# Patient Record
Sex: FEMALE | Race: WHITE | NOT HISPANIC OR LATINO
[De-identification: names, ages, dates, MRNs, and addresses within clinical notes are randomized per-mention and may not be internally consistent; named-entity substitution may affect disease eponyms.]

---

## 2021-03-20 PROBLEM — Z00.00 ENCOUNTER FOR PREVENTIVE HEALTH EXAMINATION: Status: ACTIVE | Noted: 2021-03-20

## 2021-06-08 ENCOUNTER — APPOINTMENT (OUTPATIENT)
Dept: ENDOCRINOLOGY | Facility: CLINIC | Age: 51
End: 2021-06-08
Payer: COMMERCIAL

## 2021-06-08 VITALS
HEIGHT: 65 IN | DIASTOLIC BLOOD PRESSURE: 70 MMHG | OXYGEN SATURATION: 97 % | SYSTOLIC BLOOD PRESSURE: 110 MMHG | HEART RATE: 70 BPM | BODY MASS INDEX: 27.66 KG/M2 | WEIGHT: 166 LBS

## 2021-06-08 DIAGNOSIS — Z78.9 OTHER SPECIFIED HEALTH STATUS: ICD-10-CM

## 2021-06-08 DIAGNOSIS — Z86.39 PERSONAL HISTORY OF OTHER ENDOCRINE, NUTRITIONAL AND METABOLIC DISEASE: ICD-10-CM

## 2021-06-08 DIAGNOSIS — Z80.9 FAMILY HISTORY OF MALIGNANT NEOPLASM, UNSPECIFIED: ICD-10-CM

## 2021-06-08 DIAGNOSIS — Z82.49 FAMILY HISTORY OF ISCHEMIC HEART DISEASE AND OTHER DISEASES OF THE CIRCULATORY SYSTEM: ICD-10-CM

## 2021-06-08 PROCEDURE — 99072 ADDL SUPL MATRL&STAF TM PHE: CPT

## 2021-06-08 PROCEDURE — 99204 OFFICE O/P NEW MOD 45 MIN: CPT

## 2021-06-08 NOTE — HISTORY OF PRESENT ILLNESS
[FreeTextEntry1] : Marcy is a 51 yr old female, who I was asked to see in consultation for evaluation  hypothyroidism,. thyroid nodule.\par Per patient she was diagnosed more than 20 years ago.She says she was very fatigued then.\par Still complains of fatigue.\par Has been on replacement since then.\par Currently on levothyroxine 125 mcg for 6 days and 250 mcg for 1 day a week.\par  takes it empty stomach in morning, denies missing any doses. \par \par Has had ultrasound for thyroid nodule follow up for about 10 years, remembers getting FNA of the nodule, was benign.\par Has had recent follow up ultrasound 2/2021\par No family h/o thyroid disorder or cancer.no h/o neck radiation. No dysphagia, no SOB.Occasional sore throat when talking a lot.\par  Still feels tired.\par Occasional heat  intolerance, menopause since 1 yr, no weight changes, no constipation or diarrhea, no palpitations, no skin or hair changes.\par Takes weekly vitamin D and daily MVI .\par \par

## 2021-06-08 NOTE — PHYSICAL EXAM
[Alert] : alert [Well Nourished] : well nourished [No Acute Distress] : no acute distress [Normal Sclera/Conjunctiva] : normal sclera/conjunctiva [No Proptosis] : no proptosis [No Lid Lag] : no lid lag [No Neck Mass] : no neck mass was observed [Supple] : the neck was supple [Thyroid Not Enlarged] : the thyroid was not enlarged [No Thyroid Nodules] : no palpable thyroid nodules [No Respiratory Distress] : no respiratory distress [No Accessory Muscle Use] : no accessory muscle use [Normal Rate and Effort] : normal respiratory rate and effort [Clear to Auscultation] : lungs were clear to auscultation bilaterally [Normal S1, S2] : normal S1 and S2 [No Murmurs] : no murmurs [Normal Rate] : heart rate was normal [Regular Rhythm] : with a regular rhythm [Normal Bowel Sounds] : normal bowel sounds [Not Tender] : non-tender [Soft] : abdomen soft [Normal Supraclavicular Nodes] : no supraclavicular lymphadenopathy [Normal Anterior Cervical Nodes] : no anterior cervical lymphadenopathy [Normal Posterior Cervical Nodes] : no posterior cervical lymphadenopathy [No Stigmata of Cushings Syndrome] : no stigmata of Cushings Syndrome [Normal Gait] : normal gait [No Clubbing, Cyanosis] : no clubbing  or cyanosis of the fingernails [No Joint Swelling] : no joint swelling seen [No Skin Lesions] : no skin lesions [Acanthosis Nigricans] : no acanthosis nigricans [No Motor Deficits] : the motor exam was normal [No Tremors] : no tremors [Oriented x3] : oriented to person, place, and time [Normal Affect] : the affect was normal [Normal Insight/Judgement] : insight and judgment were intact [Normal Mood] : the mood was normal

## 2021-06-08 NOTE — ASSESSMENT
[FreeTextEntry1] : Marcy is a 51 yr old female, who I was asked to see in consultation for evaluation  hypothyroidism,. thyroid nodule.\par Per patient she was diagnosed more than 20 years ago.She says she was very fatigued then.\par Still complains of fatigue.\par Has been on replacement since then.\par Currently on levothyroxine 125 mcg for 6 days and 250 mcg for 1 day a week.\par \par \par Hypothyroidism:\par Last TSH was 1.12 6/2021.\par To continue same dose for now.\par \par Discussed with patient how to take thyroid medication appropriately,empty stomach ,\par all Multi vitamins  4 to 6 hrs away form thyroid medication, he voiced understanding.\par Will repeat blood work in 6 months.\par \par Thyroid nodules:\par Last ultrasound done in 2/21, shows 2 nodules, 6 mm and 1 cm.\par per report unchanged which is reassuring.\par \par I have discussed with the patient the incidence of thyroid nodules (fairly high) and the incidence of thyroid cancer (fairly low). We also discussed what can be the worrisome features of malignant nodule and increased incidence of thyroid cancer in patients .\par We discussed with patient that per IVANA  guidelines  FNA is not recommended in sub centimeter nodules in low risk patient like herself risk of malignancy is very low.\par Since the nodules are small and stable, will follow up on ultrasound, repeat ultrasound in 1 yr.\par \par RTC in 6 months\par \par

## 2021-06-08 NOTE — REVIEW OF SYSTEMS
[Fatigue] : fatigue [Decreased Appetite] : appetite not decreased [Recent Weight Gain (___ Lbs)] : no recent weight gain [Recent Weight Loss (___ Lbs)] : no recent weight loss [Visual Field Defect] : no visual field defect [Dry Eyes] : no dryness [Eye Pain] : no pain [Dysphagia] : no dysphagia [Neck Pain] : no neck pain [Dysphonia] : no dysphonia [Chest Pain] : no chest pain [Palpitations] : no palpitations [Lower Ext Edema] : no lower extremity edema [Shortness Of Breath] : no shortness of breath [Cough] : no cough [Orthopnea] : no orthopnea [Nausea] : no nausea [Constipation] : no constipation [Abdominal Pain] : no abdominal pain [Vomiting] : no vomiting [Diarrhea] : no diarrhea [Polyuria] : no polyuria [Dysuria] : no dysuria [Joint Pain] : no joint pain [Muscle Weakness] : no muscle weakness [Myalgia] : no myalgia  [Acanthosis] : no acanthosis  [Acne] : no acne [Dry Skin] : no dry skin [Headaches] : no headaches [Dizziness] : no dizziness [Tremors] : no tremors [Depression] : no depression [Insomnia] : no insomnia [Anxiety] : no anxiety [Polydipsia] : no polydipsia [Cold Intolerance] : no cold intolerance [Heat Intolerance] : heat intolerance [Easy Bleeding] : no ~M tendency for easy bleeding [Easy Bruising] : no tendency for easy bruising

## 2021-11-29 ENCOUNTER — NON-APPOINTMENT (OUTPATIENT)
Age: 51
End: 2021-11-29

## 2021-12-07 ENCOUNTER — APPOINTMENT (OUTPATIENT)
Dept: ENDOCRINOLOGY | Facility: CLINIC | Age: 51
End: 2021-12-07
Payer: COMMERCIAL

## 2021-12-07 VITALS
SYSTOLIC BLOOD PRESSURE: 118 MMHG | DIASTOLIC BLOOD PRESSURE: 70 MMHG | OXYGEN SATURATION: 97 % | BODY MASS INDEX: 29.16 KG/M2 | WEIGHT: 175 LBS | HEART RATE: 65 BPM | HEIGHT: 65 IN

## 2021-12-07 PROCEDURE — 99214 OFFICE O/P EST MOD 30 MIN: CPT

## 2022-03-02 ENCOUNTER — APPOINTMENT (OUTPATIENT)
Dept: ULTRASOUND IMAGING | Facility: CLINIC | Age: 52
End: 2022-03-02
Payer: COMMERCIAL

## 2022-03-02 ENCOUNTER — OUTPATIENT (OUTPATIENT)
Dept: OUTPATIENT SERVICES | Facility: HOSPITAL | Age: 52
LOS: 1 days | End: 2022-03-02
Payer: COMMERCIAL

## 2022-03-02 DIAGNOSIS — E04.2 NONTOXIC MULTINODULAR GOITER: ICD-10-CM

## 2022-03-02 PROCEDURE — 76536 US EXAM OF HEAD AND NECK: CPT

## 2022-03-02 PROCEDURE — 76536 US EXAM OF HEAD AND NECK: CPT | Mod: 26

## 2022-03-30 ENCOUNTER — APPOINTMENT (OUTPATIENT)
Dept: ENDOCRINOLOGY | Facility: CLINIC | Age: 52
End: 2022-03-30

## 2022-04-19 ENCOUNTER — APPOINTMENT (OUTPATIENT)
Dept: ENDOCRINOLOGY | Facility: CLINIC | Age: 52
End: 2022-04-19
Payer: COMMERCIAL

## 2022-04-19 VITALS
SYSTOLIC BLOOD PRESSURE: 112 MMHG | OXYGEN SATURATION: 98 % | DIASTOLIC BLOOD PRESSURE: 72 MMHG | BODY MASS INDEX: 29.16 KG/M2 | HEART RATE: 75 BPM | HEIGHT: 65 IN | WEIGHT: 175 LBS

## 2022-04-19 PROCEDURE — 99214 OFFICE O/P EST MOD 30 MIN: CPT

## 2022-04-19 NOTE — HISTORY OF PRESENT ILLNESS
[FreeTextEntry1] : Marcy is a 51 yr old female, who is here for follow up of hypothyroidism,. thyroid nodule.\par Per patient she was diagnosed more than 20 years ago.She says she was very fatigued then.\par Still complains of fatigue.\par Has been on replacement since then.\par Currently on levothyroxine 125 mcg for 6 days and 187.5 mcg for 1 day a week.\par  takes it empty stomach in morning, denies missing any doses. \par No new complains this visit, feels fine.\par Has had ultrasound for thyroid nodule remembers getting FNA of the nodule, was benign.\par Has had recent follow up ultrasound 3/2022\par No family h/o thyroid disorder or cancer.no h/o neck radiation. No dysphagia, no SOB.Occasional sore throat when talking a lot.\par  Still feels tired.\par Occasional heat intolerance, menopause since 1 yr, no weight changes, no constipation or diarrhea, no palpitations, no skin or hair changes.\par Takes Vit D 2000 every day. \par \par \par TSH 0.302\par \par Current Regimen: 125 mcg 6 day a week, 1.5 tablets once a week \par \par Thyroid U/S 3/2022\par Left Lobe\par 0.9 x 0.6 x 0.6 cm hypoechoic nodule in upper pole\par 0.9 x 0.9 x 0.8 cm cystic nodule in lower pole \par

## 2022-04-19 NOTE — ASSESSMENT
[Levothyroxine] : The patient was instructed to take Levothyroxine on an empty stomach, separate from vitamins, and wait at least 30 minutes before eating [FreeTextEntry1] : Marcy is a 51 yr old female, who is here for follow up of hypothyroidism,. thyroid nodule.\par She has been exercising and feeling well\par Still complains of fatigue.\par Has been on replacement since then.\par Currently on levothyroxine 125 mcg for 6 days and 187 mcg for 1 day a week.\par \par \par Hypothyroidism:\par recent TSH mildly low 0.302\par Will recommend to back off on dose slightly , to take 1 pill of 125 mcg QD\par repeat tft's in 6 weeks and adjust medication if needed\par \par Discussed again with patient how to take thyroid medication appropriately,empty stomach ,\par all Multi vitamins 4 to 6 hrs away form thyroid medication, he voiced understanding.\par Will repeat blood work in 6 months.\par \par Thyroid nodules:\par Last ultrasound done in 3/92264 s hows 2 nodules, 9 mm and 9mm on left lobe\par per report unchanged which is reassuring.\par \par I have discussed with the patient the incidence of thyroid nodules (fairly high) and the incidence of thyroid cancer (fairly low). We also discussed what can be the worrisome features of malignant nodule and increased incidence of thyroid cancer in patients.\par We discussed with patient that per IVANA guidelines FNA is not recommended in sub centimeter nodules in low risk patient like herself risk of malignancy is very low.\par Since the nodules are small and stable, will follow up on ultrasound, repeat ultrasound next visit.\par \par RTC in 3 months with Dr. Bocanegra\par

## 2022-04-19 NOTE — PHYSICAL EXAM
[Alert] : alert [Normal Sclera/Conjunctiva] : normal sclera/conjunctiva [Normal Hearing] : hearing was normal [No Neck Mass] : no neck mass was observed [Thyroid Not Enlarged] : the thyroid was not enlarged [No Respiratory Distress] : no respiratory distress [Clear to Auscultation] : lungs were clear to auscultation bilaterally [Normal S1, S2] : normal S1 and S2 [Normal Rate] : heart rate was normal [No Stigmata of Cushings Syndrome] : no stigmata of Cushings Syndrome [Normal Gait] : normal gait

## 2022-04-19 NOTE — REVIEW OF SYSTEMS
[Fatigue] : fatigue [Recent Weight Loss (___ Lbs)] : no recent weight loss [Recent Weight Gain (___ Lbs)] : no recent weight gain [Visual Field Defect] : no visual field defect [Blurred Vision] : no blurred vision [Dysphagia] : no dysphagia [Neck Pain] : no neck pain [Chest Pain] : no chest pain [Palpitations] : no palpitations [Nausea] : no nausea [Shortness Of Breath] : no shortness of breath [Constipation] : no constipation [Vomiting] : no vomiting [Diarrhea] : no diarrhea [Polyuria] : no polyuria [Polydipsia] : no polydipsia

## 2022-07-20 ENCOUNTER — APPOINTMENT (OUTPATIENT)
Dept: ENDOCRINOLOGY | Facility: CLINIC | Age: 52
End: 2022-07-20

## 2022-07-20 VITALS
HEIGHT: 65 IN | BODY MASS INDEX: 27.49 KG/M2 | HEART RATE: 74 BPM | DIASTOLIC BLOOD PRESSURE: 70 MMHG | WEIGHT: 165 LBS | SYSTOLIC BLOOD PRESSURE: 110 MMHG

## 2022-07-20 PROCEDURE — 99214 OFFICE O/P EST MOD 30 MIN: CPT

## 2022-07-20 RX ORDER — ROSUVASTATIN CALCIUM 10 MG/1
10 TABLET, FILM COATED ORAL DAILY
Refills: 0 | Status: DISCONTINUED | COMMUNITY
End: 2022-07-20

## 2022-07-20 NOTE — ASSESSMENT
[FreeTextEntry1] : Marcy is a 51 yr old female, who is here for follow up of hypothyroidism,. thyroid nodule.\par Per patient she was diagnosed more than 20 years ago.She says she was very fatigued then.\par Still complains of fatigue.\par Has been on replacement since then.\par Currently on levothyroxine 125 mcg for 6 days and 250 mcg for 1 day a week.\par \par \par Hypothyroidism:\par recent TSH 0.692\par continue same dose for now\par Repeat blood work in 3 months\par Discussed again with patient how to take thyroid medication appropriately,empty stomach ,\par all Multi vitamins  4 to 6 hrs away form thyroid medication, he voiced understanding.\par Will repeat blood work in 6 months.\par \par Thyroid nodules:\par Last ultrasound done in 2/21, shows 2 nodules, 6 mm and 1 cm.\par Then had recent US in 3/22 showed 0.9 cm, 2 nodules.No comparison done to previous one as done in different location, but on my comparison Does not look like a sig. change, and both nodules less than 1 cm, risk of malignancy is low, \par will follow up on yearly US.\par \par I have discussed with the patient the incidence of thyroid nodules (fairly high) and the incidence of thyroid cancer (fairly low). We also discussed what can be the worrisome features of malignant nodule and increased incidence of thyroid cancer in patients .\par We discussed with patient that per IVANA  guidelines  FNA is not recommended in sub centimeter nodules in low risk patient like herself risk of malignancy is very low.\par \par RTC in  6 months\par \par

## 2022-07-20 NOTE — HISTORY OF PRESENT ILLNESS
[FreeTextEntry1] : Marcy is a 52 yr old female, who is here for follow up of  hypothyroidism,. thyroid nodule.\par Per patient she was diagnosed more than 20 years ago.She says she was very fatigued then.\par Still complains of fatigue.\jovan Has been on replacement since then.\jovan Was on levothyroxine 125 mcg for 6 days and 1 and 1/2 pill  for 1 day a week.Then in 4/22 dose backed off to 125 mcg daily.\jovan  takes it empty stomach in morning, denies missing any doses. \par No new complains this visit, feels fine.\jovan Has lot 10 pounds and happy about it.\jovan Has had ultrasound for thyroid nodule follow up for about 10 years, remembers getting FNA of the nodule, was benign.\jovan Has had  follow up ultrasound 2/2021.\par Then had recent US in 3/22 showed 0.9 cm, 2 nodules.\par No family h/o thyroid disorder or cancer.no h/o neck radiation. No dysphagia, no SOB.Occasional sore throat when talking a lot.\par  Still feels tired.\par Occasional heat  intolerance, menopause since 2 yrs, no weight changes, no constipation or diarrhea, no palpitations, no skin or hair changes.\par Takes weekly vitamin D and daily MVI .\par \par

## 2022-07-20 NOTE — PHYSICAL EXAM
[Alert] : alert [Well Nourished] : well nourished [No Acute Distress] : no acute distress [Normal Sclera/Conjunctiva] : normal sclera/conjunctiva [No Proptosis] : no proptosis [No Lid Lag] : no lid lag [No Neck Mass] : no neck mass was observed [Supple] : the neck was supple [Thyroid Not Enlarged] : the thyroid was not enlarged [No Thyroid Nodules] : no palpable thyroid nodules [No Respiratory Distress] : no respiratory distress [No Accessory Muscle Use] : no accessory muscle use [Normal Rate and Effort] : normal respiratory rate and effort [Clear to Auscultation] : lungs were clear to auscultation bilaterally [Normal S1, S2] : normal S1 and S2 [No Murmurs] : no murmurs [Normal Rate] : heart rate was normal [Regular Rhythm] : with a regular rhythm [Normal Bowel Sounds] : normal bowel sounds [Not Tender] : non-tender [Soft] : abdomen soft [No Tremors] : no tremors [Oriented x3] : oriented to person, place, and time [Normal Affect] : the affect was normal [Normal Insight/Judgement] : insight and judgment were intact [Normal Mood] : the mood was normal

## 2022-07-20 NOTE — REVIEW OF SYSTEMS
[Heat Intolerance] : heat intolerance [Fatigue] : no fatigue [Decreased Appetite] : appetite not decreased [Recent Weight Gain (___ Lbs)] : no recent weight gain [Recent Weight Loss (___ Lbs)] : no recent weight loss [Visual Field Defect] : no visual field defect [Dry Eyes] : no dryness [Eye Pain] : no pain [Dysphagia] : no dysphagia [Neck Pain] : no neck pain [Dysphonia] : no dysphonia [Chest Pain] : no chest pain [Palpitations] : no palpitations [Lower Ext Edema] : no lower extremity edema [Shortness Of Breath] : no shortness of breath [Cough] : no cough [Orthopnea] : no orthopnea [Nausea] : no nausea [Constipation] : no constipation [Abdominal Pain] : no abdominal pain [Vomiting] : no vomiting [Diarrhea] : no diarrhea [Polyuria] : no polyuria [Dysuria] : no dysuria [Joint Pain] : no joint pain [Muscle Weakness] : no muscle weakness [Myalgia] : no myalgia  [Acanthosis] : no acanthosis  [Acne] : no acne [Dry Skin] : no dry skin [Headaches] : no headaches [Dizziness] : no dizziness [Tremors] : no tremors [Depression] : no depression [Insomnia] : no insomnia [Anxiety] : no anxiety [Polydipsia] : no polydipsia [Cold Intolerance] : no cold intolerance [Easy Bleeding] : no ~M tendency for easy bleeding [Easy Bruising] : no tendency for easy bruising

## 2023-01-24 ENCOUNTER — APPOINTMENT (OUTPATIENT)
Dept: ENDOCRINOLOGY | Facility: CLINIC | Age: 53
End: 2023-01-24
Payer: COMMERCIAL

## 2023-01-24 VITALS
HEIGHT: 65 IN | BODY MASS INDEX: 26.16 KG/M2 | HEART RATE: 68 BPM | WEIGHT: 157 LBS | SYSTOLIC BLOOD PRESSURE: 116 MMHG | DIASTOLIC BLOOD PRESSURE: 68 MMHG

## 2023-01-24 PROCEDURE — 99214 OFFICE O/P EST MOD 30 MIN: CPT

## 2023-06-30 ENCOUNTER — APPOINTMENT (OUTPATIENT)
Dept: ULTRASOUND IMAGING | Facility: CLINIC | Age: 53
End: 2023-06-30
Payer: COMMERCIAL

## 2023-06-30 ENCOUNTER — OUTPATIENT (OUTPATIENT)
Dept: OUTPATIENT SERVICES | Facility: HOSPITAL | Age: 53
LOS: 1 days | End: 2023-06-30
Payer: COMMERCIAL

## 2023-06-30 DIAGNOSIS — E03.9 HYPOTHYROIDISM, UNSPECIFIED: ICD-10-CM

## 2023-06-30 PROCEDURE — 76536 US EXAM OF HEAD AND NECK: CPT

## 2023-06-30 PROCEDURE — 76536 US EXAM OF HEAD AND NECK: CPT | Mod: 26

## 2023-08-09 ENCOUNTER — APPOINTMENT (OUTPATIENT)
Dept: ENDOCRINOLOGY | Facility: CLINIC | Age: 53
End: 2023-08-09
Payer: COMMERCIAL

## 2023-08-09 DIAGNOSIS — E03.9 HYPOTHYROIDISM, UNSPECIFIED: ICD-10-CM

## 2023-08-09 DIAGNOSIS — E04.2 NONTOXIC MULTINODULAR GOITER: ICD-10-CM

## 2023-08-09 PROCEDURE — 99214 OFFICE O/P EST MOD 30 MIN: CPT

## 2023-08-09 NOTE — ASSESSMENT
[FreeTextEntry1] : Marcy is a 51 yr old female, who is here for follow up of hypothyroidism,. thyroid nodule. Per patient she was diagnosed more than 20 years ago.She says she was very fatigued then. Still complains of fatigue. Has been on replacement since then. Was on levothyroxine 125 mcg for 6 days and 1 and 1/2 pill  for 1 day a week. Then in 4/22 dose backed off to 125 mcg daily. Then further backed of to  1 pill for 6 days and 1/2 pill on 1 day of week in 1/23.   Hypothyroidism: recent TSH 0.217, FT4 normal. will continue with same dose for now. Repeat blood work next visit Discussed again with patient how to take thyroid medication appropriately,empty stomach , all Multi vitamins  4 to 6 hrs away form thyroid medication, he voiced understanding. Will repeat blood work in 6 months.  Thyroid nodules: Last ultrasound done in 2/21, shows 2 nodules, 6 mm and 1 cm. Then had US in 3/22 showed 0.9 cm, 2 nodules,  And now 7/23, most nodules stable.  I have discussed with the patient the incidence of thyroid nodules (fairly high) and the incidence of thyroid cancer (fairly low). We also discussed what can be the worrisome features of malignant nodule and increased incidence of thyroid cancer in patients . We discussed with patient again that per IVANA  guidelines  FNA is not recommended in sub centimeter nodules in low risk patient like herself risk of malignancy is very low. Will follow up on yearly ultrasound.  RTC in  4 months

## 2023-08-09 NOTE — PHYSICAL EXAM
[Alert] : alert [Well Nourished] : well nourished [No Acute Distress] : no acute distress [Normal Sclera/Conjunctiva] : normal sclera/conjunctiva [No Proptosis] : no proptosis [No Lid Lag] : no lid lag [No Neck Mass] : no neck mass was observed [Supple] : the neck was supple [Thyroid Not Enlarged] : the thyroid was not enlarged [No Thyroid Nodules] : no palpable thyroid nodules [No Respiratory Distress] : no respiratory distress [No Accessory Muscle Use] : no accessory muscle use [Normal Rate and Effort] : normal respiratory rate and effort [Clear to Auscultation] : lungs were clear to auscultation bilaterally [Normal S1, S2] : normal S1 and S2 [No Murmurs] : no murmurs [Normal Rate] : heart rate was normal [Regular Rhythm] : with a regular rhythm [Oriented x3] : oriented to person, place, and time [Normal Affect] : the affect was normal [Normal Insight/Judgement] : insight and judgment were intact [Normal Mood] : the mood was normal

## 2023-08-09 NOTE — REVIEW OF SYSTEMS
[Heat Intolerance] : heat intolerance [Fatigue] : no fatigue [Decreased Appetite] : appetite not decreased [Recent Weight Gain (___ Lbs)] : recent weight gain: [unfilled] lbs [Recent Weight Loss (___ Lbs)] : no recent weight loss [Visual Field Defect] : no visual field defect [Dry Eyes] : no dryness [Eye Pain] : no pain [Dysphagia] : no dysphagia [Neck Pain] : no neck pain [Dysphonia] : no dysphonia [Chest Pain] : no chest pain [Palpitations] : no palpitations [Lower Ext Edema] : no lower extremity edema [Shortness Of Breath] : no shortness of breath [Cough] : no cough [Orthopnea] : no orthopnea [Nausea] : no nausea [Constipation] : no constipation [Abdominal Pain] : no abdominal pain [Vomiting] : no vomiting [Diarrhea] : no diarrhea [Polyuria] : no polyuria [Dysuria] : no dysuria [Joint Pain] : no joint pain [Muscle Weakness] : no muscle weakness [Myalgia] : no myalgia  [Acanthosis] : no acanthosis  [Acne] : no acne [Dry Skin] : no dry skin [Headaches] : no headaches [Dizziness] : no dizziness [Tremors] : no tremors [Depression] : no depression [Insomnia] : no insomnia [Anxiety] : no anxiety [Polydipsia] : no polydipsia [Cold Intolerance] : no cold intolerance [Easy Bleeding] : no ~M tendency for easy bleeding [Easy Bruising] : no tendency for easy bruising

## 2023-08-09 NOTE — HISTORY OF PRESENT ILLNESS
[FreeTextEntry1] : Marcy is a 52 yr old female, who is here for follow up of  hypothyroidism, thyroid nodule. Per patient she was diagnosed more than 20 years ago.She says she was very fatigued then. Still complains of fatigue. Has been on replacement since then. Was on levothyroxine 125 mcg for 6 days and 1 and 1/2 pill  for 1 day a week. Then in 4/22 dose backed off to 125 mcg daily. Then further backed of to  1 pill for 6 days and 1/2 pill on 1 day of week in 1/23.  Here for follow up  takes it empty stomach in morning, denies missing any doses.  Has  gained a few pounds ,  not happy about it. Energy is fine. Has had ultrasound for thyroid nodule follow up for about 10 years, remembers getting FNA of the nodule, was benign. Has had  follow up ultrasound 2/2021. Then had repeat US in 3/22 showed 0.9 cm, 2 nodules. And now 7/23, most nodules stable. No family h/o thyroid disorder or cancer.no h/o neck radiation. No dysphagia, no SOB. Occasional sore throat when talking a lot. Occasional heat  intolerance, menopause since 2 yrs, no weight changes, no constipation or diarrhea,  no palpitations, no skin or hair changes. Takes weekly vitamin D and daily MVI .

## 2023-08-17 RX ORDER — LEVOTHYROXINE SODIUM 0.12 MG/1
125 TABLET ORAL
Qty: 90 | Refills: 1 | Status: ACTIVE | COMMUNITY
Start: 1900-01-01 | End: 1900-01-01

## 2023-12-13 ENCOUNTER — APPOINTMENT (OUTPATIENT)
Dept: ENDOCRINOLOGY | Facility: CLINIC | Age: 53
End: 2023-12-13